# Patient Record
Sex: FEMALE | Race: WHITE | Employment: FULL TIME | URBAN - METROPOLITAN AREA
[De-identification: names, ages, dates, MRNs, and addresses within clinical notes are randomized per-mention and may not be internally consistent; named-entity substitution may affect disease eponyms.]

---

## 2022-12-13 ENCOUNTER — APPOINTMENT (OUTPATIENT)
Dept: ULTRASOUND IMAGING | Age: 62
End: 2022-12-13
Payer: COMMERCIAL

## 2022-12-13 ENCOUNTER — HOSPITAL ENCOUNTER (EMERGENCY)
Age: 62
Discharge: HOME | End: 2022-12-13
Payer: COMMERCIAL

## 2022-12-13 VITALS
DIASTOLIC BLOOD PRESSURE: 65 MMHG | WEIGHT: 185 LBS | OXYGEN SATURATION: 95 % | SYSTOLIC BLOOD PRESSURE: 162 MMHG | TEMPERATURE: 97.5 F | HEIGHT: 65 IN | HEART RATE: 61 BPM | BODY MASS INDEX: 30.82 KG/M2 | RESPIRATION RATE: 16 BRPM

## 2022-12-13 DIAGNOSIS — T14.8XXA HEMATOMA: Primary | ICD-10-CM

## 2022-12-13 DIAGNOSIS — L03.116 CELLULITIS OF LEFT LOWER EXTREMITY: ICD-10-CM

## 2022-12-13 PROCEDURE — 93971 EXTREMITY STUDY: CPT

## 2022-12-13 PROCEDURE — 99284 EMERGENCY DEPT VISIT MOD MDM: CPT

## 2022-12-13 RX ORDER — DOXYCYCLINE HYCLATE 100 MG
100 TABLET ORAL 2 TIMES DAILY
Qty: 20 TABLET | Refills: 0 | Status: SHIPPED | OUTPATIENT
Start: 2022-12-13 | End: 2022-12-23

## 2022-12-13 NOTE — ED PROVIDER NOTES
[facility] 75       Department of Emergency Medicine   ED  Encounter Note  Admit Date/RoomTime: 2022  5:21 PM  ED Room: 35/  NAME: Yadira Tyler  : 1960  MRN: [id number]     Chief Complaint:  Numbness (Left leg knee and shin numbness after fall one week ago. Denies difficulty ambulating.  )    HISTORY OF PRESENT ILLNESS        Yadira Tyler is a 63 y.o. female who presents to the ED via private vehicle for left leg pain and swelling beginning about a week ago. She had a fall about a week ago and was able to ambulate but is now noticed increased swelling and ecchymosis. She still does not have any pain with ambulation but the area is becoming warm and she is concerned for DVT. She has no prior history of DVT nor other associated risk factors. ROS   Pertinent positives and negatives are stated within HPI, all other systems reviewed and are negative. Past Medical History:  has no past medical history on file. Surgical History:  has no past surgical history on file. Social History:      Family History: family history is not on file. Allergies: Patient has no known allergies. PHYSICAL EXAM   Oxygen Saturation Interpretation: Normal on room air analysis. ED Triage Vitals [22 1720]   BP Temp Temp src Heart Rate Resp SpO2 Height Weight   (!) 162/65 97.5 °F (36.4 °C) -- 61 16 95 % 5' 5\" (1.651 m) 185 lb (83.9 kg)         Physical Exam  General: Awake alert and oriented. Well-appearing. Nontoxic. HEENT: Normocephalic, atraumatic. Pupils equal  Neck: Normal range of motion  Cardiac: Regular rate  Respiratory: Respirations even, unlabored. No respiratory distress  Abdomen: Nondistended  Musculoskelatal: Moves all extremities x4 the left lower extremity with areas of ecchymosis from the knee to the ankle. Negative Ross Raymond' sign but there is an area of erythema to the distal tib-fib. There is no evidence of abscess. She does not have any limited range of motion.   She is easily palpated dorsalis pedis posterior tibial pulses. Sensation grossly intact. Her compartments are soft. Neuro: Nonlateralizing  Skin: Flesh tone, warm, dry  Psych: Normal affect  Lab / Imaging Results   (All laboratory and radiology results have been personally reviewed by myself)  Labs:  No results found for this visit on 12/13/22. Imaging: All Radiology results interpreted by Radiologist unless otherwise noted. US DUP LOWER EXTREMITY LEFT PAWEL   Final Result   No evidence of DVT in the left lower extremity. RECOMMENDATIONS:   3.8 cm x 3.7 cm x 0.9 cm fluid collection inferior to the patella. ED Course / Medical Decision Making   Medications - No data to display     Re-examination:  12/13/22       Time:   Patients condition . Consult(s):   None    Procedure(s):       MDM:   Her ultrasound is negative for DVT. She has no evidence of a compartment syndrome. She does have some mild erythema that is concerning for developing cellulitis so she is placed on doxycycline. She is advised elevation warm compresses and follow-up with PCP    Plan of Care/Counseling:  ENEIDA Gu CNP reviewed today's visit with the patient in addition to providing specific details for the plan of care and counseling regarding the diagnosis and prognosis. Questions are answered at this time and are agreeable with the plan. ASSESSMENT     1. Hematoma    2. Cellulitis of left lower extremity      PLAN   Discharged home. Patient condition is good    New Medications     New Prescriptions    DOXYCYCLINE HYCLATE (VIBRA-TABS) 100 MG TABLET    Take 1 tablet by mouth 2 times daily for 10 days     Electronically signed by ENEIDA Gu CNP   DD: 12/13/22  **This report was transcribed using voice recognition software. Every effort was made to ensure accuracy; however, inadvertent computerized transcription errors may be present.   END OF ED PROVIDER NOTE      ENEIDA Gu CNP  12/13/22 [id number]